# Patient Record
Sex: FEMALE | Race: ASIAN | Employment: FULL TIME | URBAN - METROPOLITAN AREA
[De-identification: names, ages, dates, MRNs, and addresses within clinical notes are randomized per-mention and may not be internally consistent; named-entity substitution may affect disease eponyms.]

---

## 2017-05-11 ENCOUNTER — APPOINTMENT (OUTPATIENT)
Dept: GENERAL RADIOLOGY | Facility: HOSPITAL | Age: 57
End: 2017-05-11
Payer: COMMERCIAL

## 2017-05-11 ENCOUNTER — APPOINTMENT (OUTPATIENT)
Dept: CT IMAGING | Facility: HOSPITAL | Age: 57
End: 2017-05-11
Payer: COMMERCIAL

## 2017-05-11 ENCOUNTER — HOSPITAL ENCOUNTER (EMERGENCY)
Facility: HOSPITAL | Age: 57
Discharge: HOME | End: 2017-05-12
Payer: COMMERCIAL

## 2017-05-11 VITALS
OXYGEN SATURATION: 100 % | RESPIRATION RATE: 14 BRPM | SYSTOLIC BLOOD PRESSURE: 180 MMHG | WEIGHT: 159 LBS | TEMPERATURE: 98 F | HEIGHT: 64 IN | BODY MASS INDEX: 27.14 KG/M2 | DIASTOLIC BLOOD PRESSURE: 88 MMHG | HEART RATE: 62 BPM

## 2017-05-11 DIAGNOSIS — I16.0 HYPERTENSIVE URGENCY: Primary | ICD-10-CM

## 2017-05-11 PROCEDURE — 93005 ELECTROCARDIOGRAM TRACING: CPT

## 2017-05-11 PROCEDURE — 99285 EMERGENCY DEPT VISIT HI MDM: CPT

## 2017-05-11 PROCEDURE — 70450 CT HEAD/BRAIN W/O DYE: CPT

## 2017-05-11 PROCEDURE — 71010 XR CHEST AP PORTABLE  (CPT=71010): CPT

## 2017-05-11 PROCEDURE — 84484 ASSAY OF TROPONIN QUANT: CPT

## 2017-05-11 PROCEDURE — 80053 COMPREHEN METABOLIC PANEL: CPT

## 2017-05-11 PROCEDURE — 85025 COMPLETE CBC W/AUTO DIFF WBC: CPT

## 2017-05-11 PROCEDURE — 96374 THER/PROPH/DIAG INJ IV PUSH: CPT

## 2017-05-11 PROCEDURE — 93010 ELECTROCARDIOGRAM REPORT: CPT

## 2017-05-11 RX ORDER — SODIUM CHLORIDE 9 MG/ML
INJECTION, SOLUTION INTRAVENOUS CONTINUOUS
Status: DISCONTINUED | OUTPATIENT
Start: 2017-05-11 | End: 2017-05-11

## 2017-05-11 RX ORDER — ONDANSETRON 2 MG/ML
4 INJECTION INTRAMUSCULAR; INTRAVENOUS ONCE
Status: DISCONTINUED | OUTPATIENT
Start: 2017-05-11 | End: 2017-05-11

## 2017-05-11 RX ORDER — HYDROMORPHONE HYDROCHLORIDE 1 MG/ML
0.5 INJECTION, SOLUTION INTRAMUSCULAR; INTRAVENOUS; SUBCUTANEOUS ONCE
Status: DISCONTINUED | OUTPATIENT
Start: 2017-05-11 | End: 2017-05-11

## 2017-05-11 RX ORDER — METOPROLOL TARTRATE 5 MG/5ML
5 INJECTION INTRAVENOUS ONCE
Status: COMPLETED | OUTPATIENT
Start: 2017-05-11 | End: 2017-05-11

## 2017-05-12 NOTE — ED PROVIDER NOTES
Patient Seen in: [facility] Emergency Department    History   Patient presents with:  Hypertension (cardiovascular)    Stated Complaint: hypertension     HPI    Patient is a 49-year-old female who has history of hypertension presenting to the ER ton other systems reviewed and negative except as noted above. PSFH elements reviewed from today and agreed except as otherwise stated in HPI.     Physical Exam       ED Triage Vitals   BP 05/11/17 2157 212/107 mmHg   Pulse 05/11/17 2157 90   Resp 05/11/17 2 normal limits   TROPONIN I - Normal   CBC WITH DIFFERENTIAL WITH PLATELET    Narrative: The following orders were created for panel order CBC WITH DIFFERENTIAL WITH PLATELET.   Procedure                               Abnormality         Status Portable  (cpt=71010)    5/11/2017  PROCEDURE:  XR CHEST AP PORTABLE (CPT=71010)  TECHNIQUE:  AP chest radiograph was obtained. COMPARISON:  None.   INDICATIONS:  hypertension  PATIENT STATED HISTORY: (As transcribed by Technologist)  Patient states high b MG Oral Tab  Take 1 tablet (25 mg total) by mouth 2 (two) times daily. , Normal, Disp-10 tablet, R-0

## 2017-06-04 PROBLEM — R73.9 HYPERGLYCEMIA: Status: ACTIVE | Noted: 2017-06-04

## 2017-06-04 PROBLEM — I10 ESSENTIAL (PRIMARY) HYPERTENSION: Status: ACTIVE | Noted: 2017-06-04

## 2017-11-04 ENCOUNTER — HOSPITAL ENCOUNTER (OUTPATIENT)
Dept: MAMMOGRAPHY | Age: 57
Discharge: HOME | End: 2017-11-04
Attending: INTERNAL MEDICINE
Payer: COMMERCIAL

## 2017-11-04 DIAGNOSIS — Z12.31 ENCOUNTER FOR SCREENING MAMMOGRAM FOR MALIGNANT NEOPLASM OF BREAST: ICD-10-CM

## 2017-11-04 DIAGNOSIS — Z12.39 SCREENING FOR BREAST CANCER: ICD-10-CM

## 2017-11-04 PROCEDURE — 77067 SCR MAMMO BI INCL CAD: CPT | Performed by: INTERNAL MEDICINE

## 2018-06-04 PROCEDURE — 86803 HEPATITIS C AB TEST: CPT | Performed by: INTERNAL MEDICINE

## (undated) NOTE — ED AVS SNAPSHOT
[facility] Emergency Department    [place] [address] [place] [address]    Phone:  [phone]    Fax:  [phone]           Ms. Dulce Ying   MRN: [id number]    Department:  [facility] Emergency Department   Date of Visit:  5/ Insurance plans vary and the physician(s) referred by the ER may not be covered by your plan. Please contact your insurance company to determine coverage for follow-up care and referrals.     [facility]  Main [phone]  Pediatric If the emergency physician has read X-rays, these will be re-interpreted by a radiologist.  If there is a significant change in your reading, you will be contacted. Please make sure we have your correct phone number before you leave.  After you leave, you s and ask to get set up for an insurance coverage that is in[facility]. Imaging Results         CT BRAIN OR HEAD (21699) (Final result) Result time:  05/11/17 23:29:37    Final result    Impression:    CONCLUSION:    1.  No ac high blood pressure for the past few days. FINDINGS:  Lungs are well-expanded and are free of infiltrate, effusion, pneumothorax. The cardiac silhouette is within normal limits. No congestion.              MyChart     Sign up for MyChart, your secu

## (undated) NOTE — ED AVS SNAPSHOT
[facility] Emergency Department    [facility] [address] [place] [address]    Phone:  [phone]    Fax:  [phone]           Ms. Olivia Fernandes   MRN: [id number]    Department:  [facility] Emergency Department   Date of Visit:  5/ IF THERE IS ANY CHANGE OR WORSENING OF YOUR CONDITION, CALL YOUR PRIMARY CARE PHYSICIAN AT ONCE OR RETURN IMMEDIATELY TO THE EMERGENCY DEPARTMENT.     If you have been prescribed any medication(s), please fill your prescription right away and begin taking t